# Patient Record
Sex: MALE | Race: BLACK OR AFRICAN AMERICAN | NOT HISPANIC OR LATINO | URBAN - METROPOLITAN AREA
[De-identification: names, ages, dates, MRNs, and addresses within clinical notes are randomized per-mention and may not be internally consistent; named-entity substitution may affect disease eponyms.]

---

## 2021-01-19 ENCOUNTER — EMERGENCY (EMERGENCY)
Facility: HOSPITAL | Age: 66
LOS: 1 days | Discharge: HOME | End: 2021-01-19
Admitting: EMERGENCY MEDICINE
Payer: MEDICAID

## 2021-01-19 VITALS
DIASTOLIC BLOOD PRESSURE: 70 MMHG | TEMPERATURE: 98 F | WEIGHT: 160.06 LBS | RESPIRATION RATE: 18 BRPM | OXYGEN SATURATION: 94 % | SYSTOLIC BLOOD PRESSURE: 147 MMHG | HEART RATE: 90 BPM | HEIGHT: 72 IN

## 2021-01-19 DIAGNOSIS — M62.89 OTHER SPECIFIED DISORDERS OF MUSCLE: ICD-10-CM

## 2021-01-19 DIAGNOSIS — M54.2 CERVICALGIA: ICD-10-CM

## 2021-01-19 PROCEDURE — 99283 EMERGENCY DEPT VISIT LOW MDM: CPT

## 2021-01-19 NOTE — ED PROVIDER NOTE - MUSCULOSKELETAL, MLM
Spine appears normal, range of motion is not limited, no muscle or joint tenderness; UE/LE w/FROM b/l, muscle strength 5/5 x4, good resistance x4

## 2021-01-19 NOTE — ED ADULT TRIAGE NOTE - CHIEF COMPLAINT QUOTE
pt sent in from shelter for "stiff neck". as per pt, " I am new to the shelter so they have never seen me before. The stiff neck in chronic from my meds." pt sent in from shelter for "stiff neck". as per pt, " I am new to the shelter so they have never seen me before. The stiff neck in chronic from my meds." denies fever, sore throat, HA, photosensitivity.

## 2021-01-19 NOTE — ED PROVIDER NOTE - PATIENT PORTAL LINK FT
You can access the FollowMyHealth Patient Portal offered by [facility] by registering at the following website: [email or web address]. By joining [employer]’s FollowMyHealth portal, you will also be able to view your health information using other applications (apps) compatible with our system.

## 2021-01-19 NOTE — ED PROVIDER NOTE - OBJECTIVE STATEMENT
The pt is a 66 y/o M, sent from shelter for eval of his chronic stiff neck - pt states that it's a side effect of his psych meds, states "it spasms up". New to this particular shelter. Denies neck pain, h/a, fevers, chills, numbness or tingling to fingers, si/hi/vh/ah

## 2021-01-19 NOTE — ED PROVIDER NOTE - NSFOLLOWUPINSTRUCTIONS_ED_ALL_ED_FT
YOUR NECK IS STIFF LIKELY SECONDARY TO PSYCHIATRIC MEDICATIONS THAT YOU'RE TAKING - DISCUSS SIDE EFFECTS WITH YOUR PSYCHIATRIST  YOU CAN APPLY WARM COMPRESSES IF NEEDED

## 2021-01-19 NOTE — ED ADULT NURSE NOTE - CHIEF COMPLAINT QUOTE
pt sent in from shelter for "stiff neck". as per pt, " I am new to the shelter so they have never seen me before. The stiff neck in chronic from my meds." denies fever, sore throat, HA, photosensitivity.

## 2021-01-19 NOTE — ED ADULT NURSE NOTE - OBJECTIVE STATEMENT
Pt presents reporting stiff neck. Pt reports hx of paranoid schizophrenia, states he gets monthly IM haldol which always gives him TD symptoms including neck stiffness. Pt describes his neck stiffness as consistent with baseline, reports he does not have any concerns for which he desires to be seen by ED provider today. However, pt lives in a new shelter where he states the staff are not familiar with him. In pt's words "the staff heard about my neck and panicked."   Pt given sandwich and juice. Pt reports no current concerns regarding his paranoid schizophrenia, denies any issues with medication compliance, denies hallucinations, denies SI and HI.

## 2021-01-19 NOTE — ED PROVIDER NOTE - CLINICAL SUMMARY MEDICAL DECISION MAKING FREE TEXT BOX
pt w/chronic (x yrs)  neck spasms/stiffness 2/2 to psych meds - currently in new shelter and sent for eval, pt states "my neck spasms up sometimes" -no trauma, no pain, FROM on exam, normal neuro exam, advised to discuss adverse effects w/his psychiatrist to perhaps adjust meds -no emergent indication for any meds adjustments or imaging, pt understands and agrees w/plan

## 2024-09-10 NOTE — ED ADULT NURSE NOTE - NS ED NOTE ABUSE RESPONSE YN
Contacted the pharmacy to see what is needed. PA is required. Will submit a PA for Xtampza 13.5  
PATIENT CALLED AND STATED THAT HIS PHARMACY IS UNABLE TO FILL THE XTAMPZA ER AS IT REQUIRES AN UPDATED PRIOR AUTH. PLEASE ADVISE.   
Yes